# Patient Record
Sex: MALE | Race: WHITE | NOT HISPANIC OR LATINO | URBAN - METROPOLITAN AREA
[De-identification: names, ages, dates, MRNs, and addresses within clinical notes are randomized per-mention and may not be internally consistent; named-entity substitution may affect disease eponyms.]

---

## 2017-03-13 ENCOUNTER — ALLSCRIPTS OFFICE VISIT (OUTPATIENT)
Dept: OTHER | Facility: OTHER | Age: 22
End: 2017-03-13

## 2017-03-17 ENCOUNTER — GENERIC CONVERSION - ENCOUNTER (OUTPATIENT)
Dept: OTHER | Facility: OTHER | Age: 22
End: 2017-03-17

## 2017-03-17 LAB
CULTURE RESULT (HISTORICAL): ABNORMAL
MISCELLANEOUS LAB TEST RESULT (HISTORICAL): ABNORMAL
SUSCEP. REFLEX (HISTORICAL): ABNORMAL

## 2018-01-09 NOTE — RESULT NOTES
Message   Call patient  Throat culture is positive for Staph infection      Recommend to complete antibiotic therapy with  Augmentin as prescribed for 10 days total       Verified Results  (1) THROAT CULTURE (CULTURE, UPPER RESPIRATORY) [id number] 12:00AM Alexys Diaz     Test Name Result Flag Reference   Result 1  A    Staphylococcus aureus   Heavy growth  Based on resistance to penicillin and susceptibility to oxacillin  this isolate would be susceptible to:  * Penicillinase-stable penicillins; such as:      Cloxacillin      Dicloxacillin      Nafcillin  * Beta-lactam/beta-lactamase inhibitor combinations; such as:      Amoxicillin-clavulanic acid      Ampicillin-sulbactam  * Antistaphylococcal cephems; such as:      Cefaclor      Cefuroxime  * Antistaphylococcal carbapenems; such as:      Imipenem      Meropenem   Upper Respiratory Culture Final report A    Antimicrobial Susceptibility Comment     ** S = Susceptible; I = Intermediate; R = Resistant **                     P = Positive; N = Negative              MICS are expressed in micrograms per mL     Antibiotic                 RSLT#1    RSLT#2    RSLT#3    RSLT#4  Ciprofloxacin                  S  Clindamycin                    S  Erythromycin                   S  Gentamicin                     S  Levofloxacin                   S  Linezolid                      S  Moxifloxacin                   S  Oxacillin                      S  Penicillin                     R  Quinupristin/Dalfopristin      S  Rifampin                       S  Tetracycline                   S  Trimethoprim/Sulfa             S  Vancomycin                     S

## 2018-01-13 NOTE — PROGRESS NOTES
Chief Complaint  Patient here for his last dose on Gardasil 9  3rd dose inject 0 5ml on right upper arm IM  lot [id number]  exp 02/447029  ndc [id number]  Patient tolerated well  Active Problems    1  Need for HPV vaccination (V04 89) (Z23)    Current Meds   1  No Reported Medications Recorded    Allergies    1   No Known Drug Allergies    Plan  Need for HPV vaccination    · Gardasil 9 Intramuscular Suspension Prefilled Syringe    Signatures   Electronically signed by : Claudean Squire; Feb 12 2016  5:50PM EST                       (Author)    Electronically signed by : RICHI Hernandez ; Feb 15 2016  8:08AM EST                       (Author)

## 2018-01-14 VITALS
TEMPERATURE: 99.7 F | RESPIRATION RATE: 16 BRPM | BODY MASS INDEX: 18.68 KG/M2 | HEIGHT: 67 IN | SYSTOLIC BLOOD PRESSURE: 100 MMHG | WEIGHT: 119.01 LBS | DIASTOLIC BLOOD PRESSURE: 72 MMHG | HEART RATE: 84 BPM

## 2020-12-02 ENCOUNTER — NURSE TRIAGE (OUTPATIENT)
Dept: OTHER | Facility: OTHER | Age: 25
End: 2020-12-02